# Patient Record
Sex: FEMALE | Race: WHITE | NOT HISPANIC OR LATINO | Employment: UNEMPLOYED | URBAN - METROPOLITAN AREA
[De-identification: names, ages, dates, MRNs, and addresses within clinical notes are randomized per-mention and may not be internally consistent; named-entity substitution may affect disease eponyms.]

---

## 2023-02-08 ENCOUNTER — OFFICE VISIT (OUTPATIENT)
Dept: OBSTETRICS AND GYNECOLOGY | Facility: CLINIC | Age: 28
End: 2023-02-08
Payer: MEDICAID

## 2023-02-08 VITALS
SYSTOLIC BLOOD PRESSURE: 102 MMHG | HEART RATE: 72 BPM | DIASTOLIC BLOOD PRESSURE: 68 MMHG | WEIGHT: 120 LBS | HEIGHT: 62 IN | BODY MASS INDEX: 22.08 KG/M2

## 2023-02-08 DIAGNOSIS — N93.9 ABNORMAL UTERINE BLEEDING (AUB): Primary | ICD-10-CM

## 2023-02-08 LAB
B-HCG UR QL: NEGATIVE
BILIRUB BLD-MCNC: NEGATIVE MG/DL
C TRACH RRNA CVX QL NAA+PROBE: NOT DETECTED
CANDIDA SPECIES: NEGATIVE
CLARITY, POC: CLEAR
COLOR UR: YELLOW
DEPRECATED RDW RBC AUTO: 41.8 FL (ref 37–54)
ERYTHROCYTE [DISTWIDTH] IN BLOOD BY AUTOMATED COUNT: 12.6 % (ref 12.3–15.4)
EXPIRATION DATE: NORMAL
GARDNERELLA VAGINALIS: NEGATIVE
GLUCOSE UR STRIP-MCNC: NEGATIVE MG/DL
HCT VFR BLD AUTO: 37.7 % (ref 34–46.6)
HGB BLD-MCNC: 12.1 G/DL (ref 12–15.9)
INTERNAL NEGATIVE CONTROL: NEGATIVE
INTERNAL POSITIVE CONTROL: NORMAL
KETONES UR QL: NEGATIVE
LEUKOCYTE EST, POC: NEGATIVE
Lab: NORMAL
MCH RBC QN AUTO: 29.4 PG (ref 26.6–33)
MCHC RBC AUTO-ENTMCNC: 32.1 G/DL (ref 31.5–35.7)
MCV RBC AUTO: 91.7 FL (ref 79–97)
N GONORRHOEA RRNA SPEC QL NAA+PROBE: NOT DETECTED
NITRITE UR-MCNC: NEGATIVE MG/ML
PH UR: 8.5 [PH] (ref 5–8)
PLATELET # BLD AUTO: 303 10*3/MM3 (ref 140–450)
PMV BLD AUTO: 11.3 FL (ref 6–12)
PROT UR STRIP-MCNC: NEGATIVE MG/DL
RBC # BLD AUTO: 4.11 10*6/MM3 (ref 3.77–5.28)
RBC # UR STRIP: NEGATIVE /UL
SP GR UR: 1.01 (ref 1–1.03)
T VAGINALIS DNA VAG QL PROBE+SIG AMP: POSITIVE
UROBILINOGEN UR QL: NORMAL
WBC NRBC COR # BLD: 7.06 10*3/MM3 (ref 3.4–10.8)

## 2023-02-08 PROCEDURE — 84443 ASSAY THYROID STIM HORMONE: CPT | Performed by: NURSE PRACTITIONER

## 2023-02-08 PROCEDURE — 87491 CHLMYD TRACH DNA AMP PROBE: CPT | Performed by: NURSE PRACTITIONER

## 2023-02-08 PROCEDURE — 87591 N.GONORRHOEAE DNA AMP PROB: CPT | Performed by: NURSE PRACTITIONER

## 2023-02-08 PROCEDURE — 99204 OFFICE O/P NEW MOD 45 MIN: CPT | Performed by: NURSE PRACTITIONER

## 2023-02-08 PROCEDURE — 85027 COMPLETE CBC AUTOMATED: CPT | Performed by: NURSE PRACTITIONER

## 2023-02-08 PROCEDURE — 87480 CANDIDA DNA DIR PROBE: CPT | Performed by: NURSE PRACTITIONER

## 2023-02-08 PROCEDURE — 87510 GARDNER VAG DNA DIR PROBE: CPT | Performed by: NURSE PRACTITIONER

## 2023-02-08 PROCEDURE — 81025 URINE PREGNANCY TEST: CPT | Performed by: NURSE PRACTITIONER

## 2023-02-08 PROCEDURE — 87660 TRICHOMONAS VAGIN DIR PROBE: CPT | Performed by: NURSE PRACTITIONER

## 2023-02-08 NOTE — PROGRESS NOTES
"GYN Visit    CC:   Chief Complaint   Patient presents with   • Menstrual Problem     Bleeding since the end of December        HPI:   27 y.o. Contraception or HRT: Contraception:  None    Has been bleeding since starting her cycle on 22.  Flow is typically heavy, using a 5-6 tampons a day.  Has been having blood clots.    Has pain with bowel movements, does have cramping with her bleeding.  Is not using birth control.  States could have been pregnant.     Cycle is normally regular, typically lasts 3-4 days, not usually heavy.       States this is the first time she has had this happen.       History: PMHx, Meds, Allergies, PSHx, Social Hx, and POBHx all reviewed and updated.    Review of Systems   Constitutional: Negative.    Genitourinary: Positive for menstrual problem.       PHYSICAL EXAM:  /68   Pulse 72   Ht 157.5 cm (62\")   Wt 54.4 kg (120 lb)   LMP 2022 (Approximate)   BMI 21.95 kg/m²      Physical Exam  Vitals and nursing note reviewed. Exam conducted with a chaperone present.   Constitutional:       Appearance: Normal appearance. She is well-developed and well-groomed.   HENT:      Head: Normocephalic.   Eyes:      Pupils: Pupils are equal, round, and reactive to light.   Genitourinary:     General: Normal vulva.      Exam position: Lithotomy position.      Pubic Area: No rash or pubic lice.       Labia:         Right: No rash, tenderness, lesion or injury.         Left: No rash, tenderness, lesion or injury.       Vagina: Normal. No foreign body. No vaginal discharge, erythema, tenderness, bleeding or lesions.      Cervix: Normal.      Uterus: Normal. Tender.       Comments: Light dark red menstrual flow noted, uterus slightly tender to palpation  Skin:     General: Skin is warm and dry.   Neurological:      General: No focal deficit present.      Mental Status: She is alert.         ASSESSMENT AND PLAN:  Diagnoses and all orders for this visit:    1. Abnormal uterine bleeding " (AUB) (Primary)  Overview:  Urine dip and pregnancy test negative.  Will draw labs in office today to check TFTs and CBC and order ultrasound.   Will also check for vaginal infections.    Orders:  -     POC Pregnancy, Urine  -     POC Urinalysis Dipstick  -     Chlamydia trachomatis, Neisseria gonorrhoeae, PCR - Swab, Cervix  -     Gardnerella vaginalis, Trichomonas vaginalis, Candida albicans, DNA - Swab, Cervix  -     TSH  -     CBC (No Diff)  -     US Non-ob Transvaginal; Future      Counseling:  • Continue to monitor bleeding    Follow Up:  Return as needed.      Abelardo Mayer, APRN  02/08/2023    [facility] [place]  [facility]  [address] [place] [address]  Dept: [phone]  Loc: [phone]

## 2023-02-09 ENCOUNTER — TELEPHONE (OUTPATIENT)
Dept: OBSTETRICS AND GYNECOLOGY | Facility: CLINIC | Age: 28
End: 2023-02-09
Payer: COMMERCIAL

## 2023-02-09 DIAGNOSIS — A59.01 TRICHOMONAL VAGINITIS: Primary | ICD-10-CM

## 2023-02-09 LAB — TSH SERPL DL<=0.05 MIU/L-ACNC: 2.29 UIU/ML (ref 0.27–4.2)

## 2023-02-09 RX ORDER — METRONIDAZOLE 500 MG/1
500 TABLET ORAL 2 TIMES DAILY
Qty: 14 TABLET | Refills: 0 | Status: SHIPPED | OUTPATIENT
Start: 2023-02-09 | End: 2023-02-16

## 2023-02-09 NOTE — TELEPHONE ENCOUNTER
Please inform patient via [facility] that her swabs are positive for trich, otherwise her labs are normal.  I will print prescription for you to fax.  This infection may be the cause some of her bleeding.  Would recommend she have ultrasound as ordered.

## 2023-02-09 NOTE — TELEPHONE ENCOUNTER
Marjanry I meant to put [facility] there phone # [phone] opt [phone] and fax is [phone]. I have called and made sure received information. And if scheduling does not reach out to them by today to call them to schedule ultrasound for patient to have done.